# Patient Record
Sex: FEMALE | Race: WHITE | NOT HISPANIC OR LATINO | URBAN - METROPOLITAN AREA
[De-identification: names, ages, dates, MRNs, and addresses within clinical notes are randomized per-mention and may not be internally consistent; named-entity substitution may affect disease eponyms.]

---

## 2019-03-28 ENCOUNTER — AMBULATORY SURGICAL CENTER (OUTPATIENT)
Dept: URBAN - METROPOLITAN AREA SURGERY 3 | Facility: SURGERY | Age: 69
End: 2019-03-28
Payer: MEDICARE

## 2019-03-28 VITALS
TEMPERATURE: 97.3 F | TEMPERATURE: 98 F | OXYGEN SATURATION: 99 % | OXYGEN SATURATION: 98 % | SYSTOLIC BLOOD PRESSURE: 103 MMHG | DIASTOLIC BLOOD PRESSURE: 68 MMHG | RESPIRATION RATE: 16 BRPM | DIASTOLIC BLOOD PRESSURE: 65 MMHG | HEART RATE: 93 BPM | OXYGEN SATURATION: 100 % | SYSTOLIC BLOOD PRESSURE: 164 MMHG | RESPIRATION RATE: 20 BRPM | RESPIRATION RATE: 16 BRPM | HEART RATE: 76 BPM | DIASTOLIC BLOOD PRESSURE: 65 MMHG | HEART RATE: 80 BPM | HEART RATE: 78 BPM | WEIGHT: 111 LBS | SYSTOLIC BLOOD PRESSURE: 120 MMHG | OXYGEN SATURATION: 98 % | TEMPERATURE: 98 F | OXYGEN SATURATION: 99 % | DIASTOLIC BLOOD PRESSURE: 93 MMHG | SYSTOLIC BLOOD PRESSURE: 120 MMHG | HEIGHT: 63 IN | RESPIRATION RATE: 20 BRPM | HEART RATE: 80 BPM | HEART RATE: 76 BPM | WEIGHT: 111 LBS | DIASTOLIC BLOOD PRESSURE: 93 MMHG | SYSTOLIC BLOOD PRESSURE: 88 MMHG | HEIGHT: 63 IN | DIASTOLIC BLOOD PRESSURE: 53 MMHG | SYSTOLIC BLOOD PRESSURE: 164 MMHG | DIASTOLIC BLOOD PRESSURE: 53 MMHG | TEMPERATURE: 97.3 F | SYSTOLIC BLOOD PRESSURE: 105 MMHG | SYSTOLIC BLOOD PRESSURE: 105 MMHG | SYSTOLIC BLOOD PRESSURE: 88 MMHG | DIASTOLIC BLOOD PRESSURE: 60 MMHG | HEART RATE: 93 BPM | DIASTOLIC BLOOD PRESSURE: 68 MMHG | OXYGEN SATURATION: 100 % | HEART RATE: 78 BPM | DIASTOLIC BLOOD PRESSURE: 60 MMHG | SYSTOLIC BLOOD PRESSURE: 103 MMHG

## 2019-03-28 DIAGNOSIS — Z83.71 FAMILY HISTORY OF COLONIC POLYPS: ICD-10-CM

## 2019-03-28 PROCEDURE — G0105 COLORECTAL SCRN; HI RISK IND: HCPCS | Performed by: INTERNAL MEDICINE

## 2019-03-28 PROCEDURE — G8907 PT DOC NO EVENTS ON DISCHARG: HCPCS | Performed by: INTERNAL MEDICINE

## 2019-03-28 PROCEDURE — G8918 PT W/O PREOP ORDER IV AB PRO: HCPCS | Performed by: INTERNAL MEDICINE

## 2023-09-25 ENCOUNTER — OFFICE (OUTPATIENT)
Dept: URBAN - METROPOLITAN AREA CLINIC 14 | Facility: CLINIC | Age: 73
End: 2023-09-25

## 2023-09-25 VITALS
WEIGHT: 107 LBS | DIASTOLIC BLOOD PRESSURE: 70 MMHG | HEART RATE: 86 BPM | SYSTOLIC BLOOD PRESSURE: 136 MMHG | HEIGHT: 63 IN | RESPIRATION RATE: 18 BRPM | OXYGEN SATURATION: 99 %

## 2023-09-25 DIAGNOSIS — R14.0 ABDOMINAL DISTENSION (GASEOUS): ICD-10-CM

## 2023-09-25 DIAGNOSIS — R19.7 DIARRHEA, UNSPECIFIED: ICD-10-CM

## 2023-09-25 DIAGNOSIS — R11.0 NAUSEA: ICD-10-CM

## 2023-09-25 DIAGNOSIS — R10.31 RIGHT LOWER QUADRANT PAIN: ICD-10-CM

## 2023-09-25 DIAGNOSIS — R10.11 RIGHT UPPER QUADRANT PAIN: ICD-10-CM

## 2023-09-25 PROCEDURE — 99204 OFFICE O/P NEW MOD 45 MIN: CPT | Performed by: NURSE PRACTITIONER

## 2023-10-25 ENCOUNTER — OFFICE (OUTPATIENT)
Dept: URBAN - METROPOLITAN AREA CLINIC 11 | Facility: CLINIC | Age: 73
End: 2023-10-25

## 2023-10-25 DIAGNOSIS — R11.0 NAUSEA: ICD-10-CM

## 2023-10-25 DIAGNOSIS — R19.7 DIARRHEA, UNSPECIFIED: ICD-10-CM

## 2023-10-25 PROCEDURE — 91065 BREATH HYDROGEN/METHANE TEST: CPT | Performed by: INTERNAL MEDICINE

## 2023-12-21 ENCOUNTER — OFFICE (OUTPATIENT)
Dept: URBAN - METROPOLITAN AREA CLINIC 11 | Facility: CLINIC | Age: 73
End: 2023-12-21

## 2023-12-21 VITALS
WEIGHT: 107 LBS | HEIGHT: 63 IN | DIASTOLIC BLOOD PRESSURE: 77 MMHG | SYSTOLIC BLOOD PRESSURE: 184 MMHG | OXYGEN SATURATION: 98 % | HEART RATE: 102 BPM

## 2023-12-21 DIAGNOSIS — K63.8219 SMALL INTESTINAL BACTERIAL OVERGROWTH, UNSPECIFIED: ICD-10-CM

## 2023-12-21 PROCEDURE — 99213 OFFICE O/P EST LOW 20 MIN: CPT | Performed by: NURSE PRACTITIONER

## 2024-03-21 ENCOUNTER — OFFICE (OUTPATIENT)
Dept: URBAN - METROPOLITAN AREA CLINIC 11 | Facility: CLINIC | Age: 74
End: 2024-03-21
Payer: COMMERCIAL

## 2024-03-21 VITALS
HEIGHT: 63 IN | SYSTOLIC BLOOD PRESSURE: 159 MMHG | HEART RATE: 72 BPM | OXYGEN SATURATION: 96 % | DIASTOLIC BLOOD PRESSURE: 68 MMHG | DIASTOLIC BLOOD PRESSURE: 67 MMHG | WEIGHT: 107 LBS | SYSTOLIC BLOOD PRESSURE: 154 MMHG

## 2024-03-21 DIAGNOSIS — Z83.718 OTHER FAMILY HISTORY OF COLON POLYPS: ICD-10-CM

## 2024-03-21 DIAGNOSIS — K63.8219 SMALL INTESTINAL BACTERIAL OVERGROWTH, UNSPECIFIED: ICD-10-CM

## 2024-03-21 DIAGNOSIS — K58.9 IRRITABLE BOWEL SYNDROME WITHOUT DIARRHEA: ICD-10-CM

## 2024-03-21 PROCEDURE — 99214 OFFICE O/P EST MOD 30 MIN: CPT | Performed by: NURSE PRACTITIONER

## 2024-03-21 RX ORDER — SODIUM PICOSULFATE, MAGNESIUM OXIDE, AND ANHYDROUS CITRIC ACID 10; 3.5; 12 MG/160ML; G/160ML; G/160ML
LIQUID ORAL
Qty: 350 | Refills: 0 | Status: COMPLETED
Start: 2024-03-21 | End: 2024-07-16

## 2024-07-16 ENCOUNTER — AMBULATORY SURGICAL CENTER (OUTPATIENT)
Dept: URBAN - METROPOLITAN AREA SURGERY 2 | Facility: SURGERY | Age: 74
End: 2024-07-16
Payer: MEDICARE

## 2024-07-16 VITALS
TEMPERATURE: 98.8 F | SYSTOLIC BLOOD PRESSURE: 159 MMHG | RESPIRATION RATE: 17 BRPM | WEIGHT: 104 LBS | RESPIRATION RATE: 16 BRPM | DIASTOLIC BLOOD PRESSURE: 82 MMHG | TEMPERATURE: 98.8 F | OXYGEN SATURATION: 94 % | TEMPERATURE: 96.8 F | TEMPERATURE: 96.8 F | TEMPERATURE: 98.2 F | HEART RATE: 70 BPM | OXYGEN SATURATION: 96 % | SYSTOLIC BLOOD PRESSURE: 106 MMHG | OXYGEN SATURATION: 94 % | SYSTOLIC BLOOD PRESSURE: 112 MMHG | RESPIRATION RATE: 18 BRPM | SYSTOLIC BLOOD PRESSURE: 99 MMHG | RESPIRATION RATE: 17 BRPM | SYSTOLIC BLOOD PRESSURE: 106 MMHG | DIASTOLIC BLOOD PRESSURE: 82 MMHG | HEIGHT: 63 IN | HEART RATE: 85 BPM | DIASTOLIC BLOOD PRESSURE: 46 MMHG | WEIGHT: 104 LBS | DIASTOLIC BLOOD PRESSURE: 55 MMHG | DIASTOLIC BLOOD PRESSURE: 60 MMHG | HEART RATE: 85 BPM | HEART RATE: 70 BPM | SYSTOLIC BLOOD PRESSURE: 112 MMHG | RESPIRATION RATE: 18 BRPM | HEART RATE: 80 BPM | HEART RATE: 80 BPM | HEART RATE: 77 BPM | RESPIRATION RATE: 16 BRPM | HEART RATE: 77 BPM | TEMPERATURE: 98.2 F | SYSTOLIC BLOOD PRESSURE: 99 MMHG | SYSTOLIC BLOOD PRESSURE: 159 MMHG | HEIGHT: 63 IN | OXYGEN SATURATION: 96 % | DIASTOLIC BLOOD PRESSURE: 46 MMHG | DIASTOLIC BLOOD PRESSURE: 55 MMHG | DIASTOLIC BLOOD PRESSURE: 60 MMHG

## 2024-07-16 DIAGNOSIS — Z12.11 ENCOUNTER FOR SCREENING FOR MALIGNANT NEOPLASM OF COLON: ICD-10-CM

## 2024-07-16 DIAGNOSIS — K57.30 DIVERTICULOSIS OF LARGE INTESTINE WITHOUT PERFORATION OR ABS: ICD-10-CM

## 2024-07-16 DIAGNOSIS — Z83.719 FAMILY HISTORY OF COLON POLYPS, UNSPECIFIED: ICD-10-CM

## 2024-07-16 DIAGNOSIS — K64.0 FIRST DEGREE HEMORRHOIDS: ICD-10-CM

## 2024-07-16 PROCEDURE — G0105 COLORECTAL SCRN; HI RISK IND: HCPCS | Performed by: INTERNAL MEDICINE

## 2024-12-17 ENCOUNTER — OFFICE (OUTPATIENT)
Dept: URBAN - METROPOLITAN AREA CLINIC 11 | Facility: CLINIC | Age: 74
End: 2024-12-17
Payer: MEDICARE

## 2024-12-17 VITALS
DIASTOLIC BLOOD PRESSURE: 79 MMHG | HEART RATE: 88 BPM | WEIGHT: 109 LBS | OXYGEN SATURATION: 96 % | HEIGHT: 63 IN | SYSTOLIC BLOOD PRESSURE: 170 MMHG | SYSTOLIC BLOOD PRESSURE: 188 MMHG | DIASTOLIC BLOOD PRESSURE: 78 MMHG

## 2024-12-17 DIAGNOSIS — K86.81 EXOCRINE PANCREATIC INSUFFICIENCY: ICD-10-CM

## 2024-12-17 DIAGNOSIS — K63.8219 SMALL INTESTINAL BACTERIAL OVERGROWTH, UNSPECIFIED: ICD-10-CM

## 2024-12-17 DIAGNOSIS — K58.0 IRRITABLE BOWEL SYNDROME WITH DIARRHEA: ICD-10-CM

## 2024-12-17 PROCEDURE — 99213 OFFICE O/P EST LOW 20 MIN: CPT | Performed by: NURSE PRACTITIONER

## 2024-12-17 NOTE — SERVICENOTES
Parts of today's note were obtained using AI scribe, after obtaining verbal consent from the patient.

## 2024-12-17 NOTE — SERVICEHPINOTES
Michaela Heaton, a 74-year-old patient with a history of Irritable Bowel Syndrome with Diarrhea (IBS-D), Small Intestinal Bacterial Overgrowth (SIBO), and Exocrine Pancreatic Insufficiency (EPI), presents for a follow-up visit. The patient reports a recent change in bowel movements, noting a return of the foul odor previously associated with peak SIBO symptoms. However, the patient denies the recurrence of diarrhea.The patient has been taking Creon, a pancreatic enzyme, for the EPI. The dosage varies from four to five pills a day, depending on meal intake. The patient believes the medication has been beneficial, noting a stabilization in weight since the last visit in September.The patient has a history of being thin, with the highest non-pregnancy weight recorded at 115 pounds. The patient's weight has been a concern for the endocrinologist, but recent stability has eased these concerns.The patient has been managing medication costs by using [facility], which has proven to be more affordable than local options. The patient has sufficient Creon supply and refills available.

## 2025-06-17 ENCOUNTER — OFFICE (OUTPATIENT)
Dept: URBAN - METROPOLITAN AREA CLINIC 11 | Facility: CLINIC | Age: 75
End: 2025-06-17
Payer: MEDICARE

## 2025-06-17 VITALS
DIASTOLIC BLOOD PRESSURE: 53 MMHG | WEIGHT: 110 LBS | HEART RATE: 90 BPM | OXYGEN SATURATION: 98 % | SYSTOLIC BLOOD PRESSURE: 171 MMHG | SYSTOLIC BLOOD PRESSURE: 141 MMHG | DIASTOLIC BLOOD PRESSURE: 76 MMHG | HEIGHT: 63 IN

## 2025-06-17 DIAGNOSIS — K63.8219 SMALL INTESTINAL BACTERIAL OVERGROWTH, UNSPECIFIED: ICD-10-CM

## 2025-06-17 DIAGNOSIS — K86.81 EXOCRINE PANCREATIC INSUFFICIENCY: ICD-10-CM

## 2025-06-17 DIAGNOSIS — R19.7 DIARRHEA, UNSPECIFIED: ICD-10-CM

## 2025-06-17 DIAGNOSIS — R14.0 ABDOMINAL DISTENSION (GASEOUS): ICD-10-CM

## 2025-06-17 PROCEDURE — 99213 OFFICE O/P EST LOW 20 MIN: CPT | Performed by: NURSE PRACTITIONER

## 2025-06-17 RX ORDER — PANCRELIPASE 36000; 180000; 114000 [USP'U]/1; [USP'U]/1; [USP'U]/1
CAPSULE, DELAYED RELEASE PELLETS ORAL
Qty: 450 | Refills: 5 | Status: ACTIVE
Start: 2024-09-30

## 2025-06-17 NOTE — SERVICEHPINOTES
The patient is a 75 year old who presents for a six month follow-up after completing a course of Xifaxan for diarrhea. She completed Xifaxan in December, resolving her diarrhea. Since then, she has not experienced further diarrhea. She continues Creon with every meal, maintaining formed bowel movements two to three times daily. Increased burping is noted since starting Creon, but she prefers this over diarrhea. Occasional bloating occurs after consuming beef, particularly steak, but not ground beef. No medication is taken for bloating. No nausea, vomiting, dysphagia, reflux, abdominal pain, hematochezia, or melena. No new symptoms or issues are reported.

## 2025-06-17 NOTE — SERVICENOTES
Parts of today's note were obtained using AI scribe, after obtaining verbal consent from the patient. Michaela Meza DNP acting as scribe